# Patient Record
Sex: FEMALE | URBAN - METROPOLITAN AREA
[De-identification: names, ages, dates, MRNs, and addresses within clinical notes are randomized per-mention and may not be internally consistent; named-entity substitution may affect disease eponyms.]

---

## 2019-06-12 ENCOUNTER — OFFICE VISIT (OUTPATIENT)
Dept: URBAN - METROPOLITAN AREA CLINIC 76 | Facility: CLINIC | Age: 24
End: 2019-06-12
Payer: COMMERCIAL

## 2019-06-12 DIAGNOSIS — H52.13 MYOPIA, BILATERAL: ICD-10-CM

## 2019-06-12 PROCEDURE — 99204 OFFICE O/P NEW MOD 45 MIN: CPT | Performed by: OPTOMETRIST

## 2019-06-12 ASSESSMENT — KERATOMETRY
OD: 45.75
OS: 45.88

## 2019-06-12 ASSESSMENT — INTRAOCULAR PRESSURE
OD: 13
OS: 14

## 2019-06-12 NOTE — IMPRESSION/PLAN
Impression: Type 1 diabetes mellitus w/o complication: E85.7. Plan: A glasses prescription has been discussed and generated. Patient to call with any concerns.

## 2019-06-12 NOTE — IMPRESSION/PLAN
Impression: Myopia, bilateral: H52.13. Plan: Discussed. Advised refraction and glasses. Informed that her vision barely qualifies for legal driving standard. Pt plans to utilize [employer]. Explained that fluctuations in diabetes control could cause fluctuations in glasses prescriptions.

## 2021-04-13 ENCOUNTER — OFFICE VISIT (OUTPATIENT)
Dept: URBAN - METROPOLITAN AREA CLINIC 76 | Facility: CLINIC | Age: 26
End: 2021-04-13
Payer: COMMERCIAL

## 2021-04-13 DIAGNOSIS — E10.9 TYPE 1 DIABETES MELLITUS W/O COMPLICATION: ICD-10-CM

## 2021-04-13 PROCEDURE — 92014 COMPRE OPH EXAM EST PT 1/>: CPT | Performed by: OPTOMETRIST

## 2021-04-13 ASSESSMENT — VISUAL ACUITY
OS: 20/20
OD: 20/20

## 2021-04-13 ASSESSMENT — KERATOMETRY
OS: 45.88
OD: 46.13

## 2021-04-13 ASSESSMENT — INTRAOCULAR PRESSURE
OD: 18
OS: 19

## 2021-04-13 NOTE — IMPRESSION/PLAN
Impression: Myopia, bilateral: H52.13. Plan: Discussed. Advised refraction and glasses. Informed that her vision barely qualifies for legal driving standard. Explained that fluctuations in diabetes control could cause fluctuations in glasses prescriptions.

## 2021-04-13 NOTE — IMPRESSION/PLAN
Impression: Type 1 diabetes mellitus w/o complication: S51.4. Bilateral. Plan: DM: No diabetic retinopathy. Discussed ocular and systemic benefits of blood sugar control. Patient was instructed to monitor vision for changes and to call if noted.